# Patient Record
Sex: FEMALE | Race: BLACK OR AFRICAN AMERICAN | NOT HISPANIC OR LATINO | URBAN - METROPOLITAN AREA
[De-identification: names, ages, dates, MRNs, and addresses within clinical notes are randomized per-mention and may not be internally consistent; named-entity substitution may affect disease eponyms.]

---

## 2021-05-13 ENCOUNTER — EMERGENCY (EMERGENCY)
Facility: HOSPITAL | Age: 61
LOS: 0 days | Discharge: HOME | End: 2021-05-13
Payer: COMMERCIAL

## 2021-05-13 VITALS
SYSTOLIC BLOOD PRESSURE: 121 MMHG | DIASTOLIC BLOOD PRESSURE: 68 MMHG | OXYGEN SATURATION: 99 % | TEMPERATURE: 99 F | HEART RATE: 80 BPM | RESPIRATION RATE: 18 BRPM

## 2021-05-13 DIAGNOSIS — M25.561 PAIN IN RIGHT KNEE: ICD-10-CM

## 2021-05-13 DIAGNOSIS — M79.89 OTHER SPECIFIED SOFT TISSUE DISORDERS: ICD-10-CM

## 2021-05-13 PROCEDURE — 73562 X-RAY EXAM OF KNEE 3: CPT | Mod: 26,RT

## 2021-05-13 PROCEDURE — 99284 EMERGENCY DEPT VISIT MOD MDM: CPT

## 2021-05-13 RX ORDER — IBUPROFEN 200 MG
600 TABLET ORAL ONCE
Refills: 0 | Status: COMPLETED | OUTPATIENT
Start: 2021-05-13 | End: 2021-05-13

## 2021-05-13 RX ORDER — KETOROLAC TROMETHAMINE 30 MG/ML
30 SYRINGE (ML) INJECTION ONCE
Refills: 0 | Status: DISCONTINUED | OUTPATIENT
Start: 2021-05-13 | End: 2021-05-13

## 2021-05-13 RX ADMIN — Medication 600 MILLIGRAM(S): at 19:37

## 2021-05-13 NOTE — ED PROVIDER NOTE - PATIENT PORTAL LINK FT
You can access the FollowMyHealth Patient Portal offered by [facility] by registering at the following website: [email or web address]. By joining [employer]’s FollowMyHealth portal, you will also be able to view your health information using other applications (apps) compatible with our system.

## 2021-05-13 NOTE — ED PROVIDER NOTE - CARE PROVIDER_API CALL
Ok Mccoy)  Orthopaedic Surgery; Sports Medicine  [address], [place] [address]  Phone: [phone]  Fax: [phone]  Follow Up Time:

## 2021-05-13 NOTE — ED ADULT NURSE NOTE - CAS EDN DISCHARGE ASSESSMENT
[email or web address],DirectAddress_Unknown,DirectAddress_Unknown
Alert and oriented to person, place and time

## 2021-05-13 NOTE — ED ADULT TRIAGE NOTE - CHIEF COMPLAINT QUOTE
pt complaining of swelling and pain to left knee since yesterday. denies trauma. denies any medical history.

## 2021-05-13 NOTE — ED PROVIDER NOTE - CLINICAL SUMMARY MEDICAL DECISION MAKING FREE TEXT BOX
patient presents with knee pain, mild edema on exam, no calf tenderness, consider arthritis vs sprain vs gout. Recommend xray, course of nsaids, ortho f/u

## 2021-05-13 NOTE — ED ADULT NURSE NOTE - NS ED PATIENT SAFETY CONCERN
Normal Normal Normal Normal Normal Normal Normal Normal Normal Normal No Normal Normal Normal Normal Normal Normal Normal Normal Normal Normal Normal Normal Normal Normal Normal Normal Normal Normal Normal Normal Normal Normal Normal Normal Normal Normal Normal Normal Normal

## 2021-05-13 NOTE — ED PROVIDER NOTE - OBJECTIVE STATEMENT
60F, otherwise healthy, here with right knee pain. She reports pain and swelling to the medial knee since last night. No trauma or injury. Denies redness or warmth. No fever or chills. no numbness or weakness. Ambulatory without issue.

## 2021-12-25 ENCOUNTER — EMERGENCY (EMERGENCY)
Facility: HOSPITAL | Age: 61
LOS: 0 days | Discharge: HOME | End: 2021-12-25
Attending: EMERGENCY MEDICINE
Payer: COMMERCIAL

## 2021-12-25 VITALS
SYSTOLIC BLOOD PRESSURE: 117 MMHG | HEIGHT: 65 IN | WEIGHT: 149.03 LBS | OXYGEN SATURATION: 96 % | TEMPERATURE: 98 F | DIASTOLIC BLOOD PRESSURE: 72 MMHG | HEART RATE: 67 BPM | RESPIRATION RATE: 18 BRPM

## 2021-12-25 DIAGNOSIS — M79.672 PAIN IN LEFT FOOT: ICD-10-CM

## 2021-12-25 DIAGNOSIS — R20.2 PARESTHESIA OF SKIN: ICD-10-CM

## 2021-12-25 DIAGNOSIS — M79.671 PAIN IN RIGHT FOOT: ICD-10-CM

## 2021-12-25 DIAGNOSIS — G62.9 POLYNEUROPATHY, UNSPECIFIED: ICD-10-CM

## 2021-12-25 LAB — GLUCOSE BLDC GLUCOMTR-MCNC: 129 MG/DL — HIGH (ref 70–99)

## 2021-12-25 PROCEDURE — 99283 EMERGENCY DEPT VISIT LOW MDM: CPT

## 2021-12-25 RX ORDER — IBUPROFEN 200 MG
1 TABLET ORAL
Qty: 20 | Refills: 0
Start: 2021-12-25 | End: 2021-12-29

## 2021-12-25 RX ORDER — IBUPROFEN 200 MG
600 TABLET ORAL ONCE
Refills: 0 | Status: COMPLETED | OUTPATIENT
Start: 2021-12-25 | End: 2021-12-25

## 2021-12-25 RX ADMIN — Medication 600 MILLIGRAM(S): at 23:29

## 2021-12-25 NOTE — ED PROVIDER NOTE - PATIENT PORTAL LINK FT
You can access the FollowMyHealth Patient Portal offered by [facility] by registering at the following website: [email or web address]. By joining [employer]’s FollowMyHealth portal, you will also be able to view your health information using other applications (apps) compatible with our system.

## 2021-12-25 NOTE — ED PROVIDER NOTE - OBJECTIVE STATEMENT
60yo female with no pmh presents with b/l plantar only feet burning and tingling for a while, but recently worse. pt is on her feet all day. denies fever, cp, sob, abd pain, trauma. pmd is on vacation    No fever/chills, No photophobia/eye pain/changes in vision, No ear pain/sore throat, No chest pain/palpitations, no SOB/cough, No abdominal pain, No N/V/D, no dysuria/frequency/discharge, No neck/back pain, no rash, no changes in neurological status/function. + feet pain.

## 2021-12-25 NOTE — ED ADULT NURSE NOTE - OBJECTIVE STATEMENT
Patient A& o x3 came in with complaints of foot pain for about a week that's burning, itching. Patient can't sleep. no chest pain, no sob. no medical hx.

## 2021-12-25 NOTE — ED PROVIDER NOTE - PHYSICAL EXAMINATION
Gen: Alert, Well appearing. NAD    Head: NC, AT, PERRL, normal lids/conjunctiva   Mskel: extremities x4 with normal ROM. no edema/erythema/cyanosis , no bony tenderness to palpation. + DP/PT pulses. CR < 3 secs.   Skin: no rash, no bruising  Neuro: AAOx3, no sensory/motor deficits

## 2021-12-25 NOTE — ED ADULT NURSE NOTE - SUICIDE SCREENING DEPRESSION
Patient:  Azul Vicente Location:  [place]   :  1947 Attending Physician:  García Stone M.D., Ph.D.     Date:  Mar 14, 2019 Note Type: Progress Note       Complaint/Presenting Problem:  Patient referred for evaluation of suspected vit B 12 deficiency leading to neurological deficit    HPI:  Patient 72 years old,  woman, in his usual state of health until 2017 when she began noticing pinching/tingling in her right foot mostly at night. She claims that sought medical attention for possible thyroid problem or vit B 12 problem but was told all was normal. Six months later, she suffered a foot drop. Extensive neurological work up including MRI and EMG were done. No definitive diagnosis was found and she was referred to [facility] for further evaluation. There, after a comprehensive work up, she was told to have possibly ALS and was offered to be enrolled in a clinical trial. She declined. Since then, neurological deficit, according to her remains limited to right leg and is not progression. She then noted that vit 12 levels in the past had been borderline and despite no hematological evidence of vit B12 deficiency, she started parenteral vit B12 thee times a week ( 1 mg). She has been on this dose since then without major changes. Referred today for further investigation about possible vit B12 deficiency as cause of neurological symptoms.     In our office today, 3/14/19, she denies gastric problems in the past including surgery, constitutional symptoms or history of anemia at any time during her life. Review of past blood work is summarize below:    Vit B12 (5/3/14) 245; (17) 264; (19) 478; (19) >2000 pg/mL  CBC (18) WBC 5.3K/mcL, hgb 14.2 g/dl and platelets 187K/mcL. MCV 92.9.  Folate (19)  >5.4 ng/mL   CBC (19) WBC 5.9K/mcL, hgb 13.8 g/dl and platelets 198K/mcL. MCV 92.4.  CMP 19 Normal  TSH (19) 1.9 mcU/mL.         PMH:   Ms. Vicente's medical  history consists of Degenerative disc disease.    Current Medications:   Cyanocobalamin, Fluticasone Propionate, Multiple Vitamins/Iron, Vitamin B-12  Medications were reviewed and updated.      Allergies:   CeleBREX  Allergies were reviewed. No new allergies reported.    Past Surgical History:  Ms. Vicente's surgical/procedural history consists of TKA in 2015 and colonoscopy in 2012.    Family History:  She has two adult aged children who are in good physical health. Her sister has RA.    Personal/Social History:   Ms. Vicente is  and she is retired. Ms. Vicente has never smoked. She is a former drinker. Ms. Vicente reports no contact with hazardous material.   Ms. Vicente reports the following support systems: her  and family. Her diet consists of regular meals. She indicates her activity level as: daily activities and ambulates with rolling walker, afo.     Review of Systems:     Constitutional Normal - No fever, chills, night sweats, excessive fatigue or weight loss.   Eyes Normal - No visual changes.   ENMT Normal - No oral lesions, hearing problems, postnasal drip or rhinorrhea.   Neck Normal -    Hematologic/Lymphatic Normal - No easy bruising or bleeding. The patient denies any tender or palpable lymph nodes.   Respiratory Normal - No dyspnea on exertion, chest pain, cough or hemoptysis reported.   Cardiovascular Normal - No anginal chest pain, palpitations or orthopnea reported.   Gastrointestinal Normal - No changes in bowel habits.   Genitourinary (F) Normal - No abnormal genital masses. No hematuria, hesitancy, incontinence, vaginal bleeding, discharge or other problems with urination.   Integumentary Normal - No chronic rashes, ulceration or other skin changes. No easy bruising or petechiae.  .   Neurologic Abnormal - Right foot drop. Numbness and tingling in right foot. Decreased strength limited to right leg.   Psychiatric Normal - No insomnia, depression, deyvi or mood swings.  No psychotropic  drugs.       Physical Examination:  Performed on Mar 14, 2019 14:17  Height - 174 cms   Weight - 67.55 kg   BSA - 1.81 sq.m   BMI - 22.3100   Temperature - 98.5 F   Pulse - 100 /min   Respiration - 14 /min   BP - 178/76 mm(hg) (HIGH)   Pain - 0  2 - Ambulatory/capable of all self-care, unable to perform any work activities. Up and about more than 50% of waking hours. (ECOG)    Constitutional Alert and cooperative. In no distress.   Eyes EOM intact. Pupils are reactive and equal. The conjunctivae and sclerae are clear and without icterus.   ENMT Mucous membranes are moist. There are no oral lesions. There is no angular cheilosis.   Neck Supple and without masses or thyromegaly. There is no jugular venous distension. There are no cervical lymphadenopathies   Respiratory Lungs are clear to auscultation.   Cardiovascular Heart sounds are regular. There are no murmurs, gallops or rubs.   Abdomen The abdomen is soft. Bowel sounds are present. There is no organomegaly. There are no masses or hernias.   Extremities The exam of extremities reveals no clubbing, normal pulses and no edema. There are no varicose veins. The skin is intact.   Musculoskeletal There are no inflamed, tender or swollen joints,   Neurologic Normal proprioceptive sensation. Decreased strength in right thigh. Right foot drop. LLE and upper extremities appear normal. No urinary retention or problems with continence.   Psychiatric Alert and oriented x 3. Coherent speech. Verbalizes understanding of our discussions today.        Laboratory:  Most recent lab results are not available for this patient.      Radiology/Pathology Reports:  No new reports to review at this time.      Pain Assessment Score: No pain reported  Current Pain Management:  Effective:       Counseling/Teaching (Time of counseling/Time of visit): 30/60 minutes discussing vit B12 deficiency with neurological and hematological characteristics in comparison with her clinical condition.      Impression:   1.- Elderly woman with neurological condition that according to her was found to be consistent in the past with early ALS but appears to be nonprogressive as per her own statement. She attributes the neurological deficit to vit B12 deficiency but there is no clinical grounds to substantiate this diagnosis at this time. In any case, mildly low vit B12 level was found several years ago and despite high titer of vit B12 now due to parenteral injections, I would like to rule out for once pernicious anemia and other hematological causes of neuropathic defects such as that causes by monoclonal gammopathy.    Plan(Problem-oriented):  1.- To request CBC with smear, LDH, plasma cell profile, MMA, parietal cell autoantibodies and anti intrinsic factor antibodies.   2.- RTC in two weeks to discuss results and final recommendations.    Return Appointment:  Two weeks      Electronically signed by:   García Stone MD    cc:  Fabian Carey MD                                              Negative

## 2021-12-25 NOTE — ED ADULT NURSE NOTE - NSIMPLEMENTINTERV_GEN_ALL_ED
Implemented All Universal Safety Interventions:  [place] to call system. Call bell, personal items and telephone within reach. Instruct patient to call for assistance. Room bathroom lighting operational. Non-slip footwear when patient is off stretcher. Physically safe environment: no spills, clutter or unnecessary equipment. Stretcher in lowest position, wheels locked, appropriate side rails in place.

## 2022-08-04 ENCOUNTER — APPOINTMENT (OUTPATIENT)
Dept: VASCULAR SURGERY | Facility: CLINIC | Age: 62
End: 2022-08-04

## 2022-10-31 ENCOUNTER — APPOINTMENT (OUTPATIENT)
Dept: VASCULAR SURGERY | Facility: CLINIC | Age: 62
End: 2022-10-31

## 2022-10-31 VITALS
TEMPERATURE: 99.2 F | HEART RATE: 60 BPM | HEIGHT: 65 IN | DIASTOLIC BLOOD PRESSURE: 73 MMHG | WEIGHT: 148 LBS | BODY MASS INDEX: 24.66 KG/M2 | SYSTOLIC BLOOD PRESSURE: 113 MMHG

## 2022-10-31 PROCEDURE — 93970 EXTREMITY STUDY: CPT

## 2022-10-31 PROCEDURE — 99203 OFFICE O/P NEW LOW 30 MIN: CPT

## 2022-11-03 NOTE — ASSESSMENT
[FreeTextEntry1] : In summary, Mrs. Arce presents with bilateral lower extremity varicose veins and spider veins. A venous duplex was performed in the office today and showed no evidence of DVT or SVT in either extremity. The left great saphenous vein appears ablated. There is reflux in the left small saphenous vein in the proximal calf only. There are multiple bilateral tributaries. She would like to proceed with bilateral stab phlebectomy, which we will schedule at her convenience. She should continue to wear compression stockings daily, exercise regularly and keep her legs elevated when possible.\par \par Thank you for allowing me to participate in the care of this nice lady. Please do not hesitate to contact me with any questions. \par

## 2022-11-03 NOTE — HISTORY OF PRESENT ILLNESS
[FreeTextEntry1] : I have just had the pleasure of seeing Mrs. Rebeca Arce in consultation for lower extremity venous insufficiency. \par \par Mrs. Arce is a jorge, otherwise healthy 62-year-old lady who presents with a history of lower extremity varicose veins and spider veins. She reports intermittent leg edema. She denies any history of lower extremity skin pigmentation changes or ulcers. She denies any history of DVT or SVT. She denies any symptoms of lower extremity claudication, rest pain, or tissue loss. She underwent a "vein procedure" at an outside facility. She does not recall the procedure that was performed. 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 She has been wearing compression stockings for many years.\par \par She denies any history of CAD, MI, CHF, CVA, TIA, CRI, or DM. \par \par Medications: Vitamins only\par \par Allergies: NDKA\par \par Social history: Non-smoker\par \par FH: NC\par

## 2022-11-03 NOTE — PHYSICAL EXAM
[de-identified] : On physical examination the patient is in no acute distress and neurologically intact. The lungs are clear to auscultation and the heart has a regular rate and rhythm. Abdomen is benign. Bilateral femoral and pedal pulses are palpable. Spider veins of bilateral anterior thighs. Spider veins and small varicosities of lateral legs.

## 2022-11-10 ENCOUNTER — APPOINTMENT (OUTPATIENT)
Dept: VASCULAR SURGERY | Facility: CLINIC | Age: 62
End: 2022-11-10

## 2022-11-21 DIAGNOSIS — Z00.00 ENCOUNTER FOR GENERAL ADULT MEDICAL EXAMINATION W/OUT ABNORMAL FINDINGS: ICD-10-CM

## 2022-11-21 RX ORDER — LIDOCAINE HYDROCHLORIDE 10 MG/ML
1 INJECTION, SOLUTION INFILTRATION; PERINEURAL
Qty: 1 | Refills: 0 | Status: ACTIVE | COMMUNITY
Start: 2022-11-21 | End: 1900-01-01

## 2022-11-21 RX ORDER — SODIUM CHLORIDE 9 G/ML
0.9 INJECTION, SOLUTION INTRAVENOUS
Qty: 1 | Refills: 0 | Status: ACTIVE | COMMUNITY
Start: 2022-11-21 | End: 1900-01-01

## 2022-11-21 RX ORDER — SODIUM BICARBONATE 84 MG/ML
8.4 INJECTION, SOLUTION INTRAVENOUS
Qty: 1 | Refills: 0 | Status: ACTIVE | COMMUNITY
Start: 2022-11-21 | End: 1900-01-01

## 2022-12-01 ENCOUNTER — APPOINTMENT (OUTPATIENT)
Dept: VASCULAR SURGERY | Facility: CLINIC | Age: 62
End: 2022-12-01

## 2023-01-05 ENCOUNTER — APPOINTMENT (OUTPATIENT)
Dept: VASCULAR SURGERY | Facility: CLINIC | Age: 63
End: 2023-01-05

## 2023-01-26 ENCOUNTER — APPOINTMENT (OUTPATIENT)
Dept: VASCULAR SURGERY | Facility: CLINIC | Age: 63
End: 2023-01-26

## 2023-03-06 ENCOUNTER — APPOINTMENT (OUTPATIENT)
Dept: VASCULAR SURGERY | Facility: CLINIC | Age: 63
End: 2023-03-06

## 2024-02-13 PROBLEM — M25.561 BILATERAL KNEE PAIN: Status: ACTIVE | Noted: 2024-02-13

## 2024-02-15 ENCOUNTER — APPOINTMENT (OUTPATIENT)
Dept: ORTHOPEDIC SURGERY | Facility: CLINIC | Age: 64
End: 2024-02-15
Payer: COMMERCIAL

## 2024-02-15 VITALS
DIASTOLIC BLOOD PRESSURE: 75 MMHG | HEIGHT: 65 IN | BODY MASS INDEX: 24.66 KG/M2 | WEIGHT: 148 LBS | SYSTOLIC BLOOD PRESSURE: 114 MMHG | HEART RATE: 65 BPM

## 2024-02-15 DIAGNOSIS — M25.561 PAIN IN RIGHT KNEE: ICD-10-CM

## 2024-02-15 DIAGNOSIS — M25.562 PAIN IN RIGHT KNEE: ICD-10-CM

## 2024-02-15 PROCEDURE — 72170 X-RAY EXAM OF PELVIS: CPT

## 2024-02-15 PROCEDURE — 99203 OFFICE O/P NEW LOW 30 MIN: CPT

## 2024-02-15 PROCEDURE — 73564 X-RAY EXAM KNEE 4 OR MORE: CPT | Mod: 50

## 2024-02-15 RX ORDER — MELOXICAM 15 MG/1
15 TABLET ORAL DAILY
Qty: 14 | Refills: 1 | Status: ACTIVE | COMMUNITY
Start: 2024-02-15 | End: 1900-01-01

## 2024-02-22 NOTE — PHYSICAL EXAM
[de-identified] : Constitutional:  63 year old female, alert and oriented, cooperative, in no acute distress.  HEENT  NC/AT.  Appearance: symmetric  Chest/Respiratory  Respiratory effort: no intercostal retractions or use of accessory muscles. Nonlabored Breathing  Mental Status:  Judgment, insight: intact Orientation: oriented to time, place, and person  Left Knee  Inspection:     Skin intact, no rashes or lesions     No Effusion     Tenderness over the medial/lateral anterior femoral condyle. Tenderness over the patella.     Able to straight leg raise with 5/5 strength  Range of Motion: 	Extension - 0 degrees 	Flexion - 120 degrees 	Extensor lag: None  Stability:      Demonstrates no Varus or Valgus instability      Negative Anterior or Posterior drawer.      Negative Lachman's  Patella: stable, tracks well.   Right Knee  Inspection:     Skin intact, no rashes or lesions     No Effusion     Tenderness over the medial/lateral anterior femoral condyle. Tenderness over the patella.     Able to straight leg raise with 5/5 strength  Range of Motion: 	Extension - 0 degrees 	Flexion - 120 degrees 	Extensor lag: None  Stability:      Demonstrates no Varus or Valgus instability      Negative Anterior or Posterior drawer.      Negative Lachman's  Patella: stable, tracks well.  Neurologic Exam     Motor intact including 5/5 Extensor Hallucis Longus, 5/5 Flexor Hallucis Longus, 5/5 Tibialis Anterior and 5/5 Gastrocnemius     Sensation Intact to Light Touch including Saphenous, Sural, Superficial Peroneal, Deep Peroneal, Tibial nerve distributions  Vascular Exam     Foot is warm and well perfused with 2+ Dorsalis Pedis Pulse   No pain with range of motion of the bilateral hips. No lumbar paraspinal muscle tenderness.  [de-identified] : XRay:  XRays of the Pelvis (1 View) taken in the office today and discussed with the patient. XRays demonstrate no obvious fracture or dislocation. There is no significant evidence of osteoarthritis or osteophyte formation. (my personal interpretation).   XRay: XRays of the Right Knee (4 Views) taken in the office today and reviewed with the patient. XRays demonstrate joint space narrowing in the medial compartment, consistent with mild osteoarthritis, KL Grade: 1. (my personal interpretation)   XRay: XRays of the Left Knee (4 Views) taken in the office today and reviewed with the patient. XRays demonstrate joint space narrowing in the medial compartment, consistent with osteoarthritis, KL Grade: 2. (my personal interpretation)

## 2024-02-22 NOTE — DISCUSSION/SUMMARY
[de-identified] : Rebeca Arce is a 63-year-old female who presents to the office for evaluation of her bilateral knee pain.  Patient had a fall onto her knees about 1 month ago.  X-rays showed no obvious fractures or dislocations.  Examination showed tenderness over the knees, but otherwise good knee range of motion.  Discussed with patient the examination and imaging findings.  Discussed with patient potential etiologies of her knee pain including, not limited to, knee contusions, knee sprain, knee strain.  Discussed with patient the management of her knee pain at this time, including physical therapy, anti-inflammatories, rest, and ice.  Patient was given a referral to physical therapy.  She was given prescription for meloxicam 15 mg daily for 14 days.  Patient will rest and ice the knees.  Patient will follow-up in 2 months for reevaluation and management.  Patient understanding and in agreement with the plan.  All questions answered.  Plan: -Physical therapy -Meloxicam 15 mg daily for 14 days -Rest and ice the knees -Follow-up in 2 months for reevaluation and management

## 2024-02-22 NOTE — HISTORY OF PRESENT ILLNESS
[de-identified] : Rebeca Arce is a 63-year-old female who presents to the office for evaluation of her bilateral knee pain.  About 1 month ago, patient was walking outside when she fell onto her bilateral anterior knees.  Patient went to the emergency department and was given ice and lidocaine patches.  She continues to have knee pain.  Pain is worse with stairs and with knee flexion.  Pain is located over the anterior knee.  No other falls.  No fevers or chills.  Patient has tried ibuprofen, with some relief.  She has tried oxycodone, but stopped it.  She can have right lateral hip pain.  Patient can also have back pain, but does not radiate.  History: None

## 2024-08-15 ENCOUNTER — APPOINTMENT (OUTPATIENT)
Dept: ORTHOPEDIC SURGERY | Facility: CLINIC | Age: 64
End: 2024-08-15